# Patient Record
Sex: FEMALE | URBAN - METROPOLITAN AREA
[De-identification: names, ages, dates, MRNs, and addresses within clinical notes are randomized per-mention and may not be internally consistent; named-entity substitution may affect disease eponyms.]

---

## 2024-05-07 SDOH — ECONOMIC STABILITY: TRANSPORTATION INSECURITY
IN THE PAST 12 MONTHS, HAS THE LACK OF TRANSPORTATION KEPT YOU FROM MEDICAL APPOINTMENTS OR FROM GETTING MEDICATIONS?: YES

## 2024-05-07 SDOH — ECONOMIC STABILITY: INCOME INSECURITY: IN THE LAST 12 MONTHS, WAS THERE A TIME WHEN YOU WERE NOT ABLE TO PAY THE MORTGAGE OR RENT ON TIME?: NO

## 2024-05-07 SDOH — ECONOMIC STABILITY: TRANSPORTATION INSECURITY
IN THE PAST 12 MONTHS, HAS LACK OF RELIABLE TRANSPORTATION KEPT YOU FROM MEDICAL APPOINTMENTS, MEETINGS, WORK OR FROM GETTING THINGS NEEDED FOR DAILY LIVING?: NO

## 2024-05-07 SDOH — ECONOMIC STABILITY: HOUSING INSECURITY: IN THE LAST 12 MONTHS, HOW MANY PLACES HAVE YOU LIVED?: 1

## 2024-05-07 SDOH — ECONOMIC STABILITY: HOUSING INSECURITY
IN THE LAST 12 MONTHS, WAS THERE A TIME WHEN YOU DID NOT HAVE A STEADY PLACE TO SLEEP OR SLEPT IN A SHELTER (INCLUDING NOW)?: NO

## 2024-05-07 SDOH — HEALTH STABILITY: PHYSICAL HEALTH: ON AVERAGE, HOW MANY DAYS PER WEEK DO YOU ENGAGE IN MODERATE TO STRENUOUS EXERCISE (LIKE A BRISK WALK)?: 6 DAYS

## 2024-05-07 SDOH — ECONOMIC STABILITY: FOOD INSECURITY: WITHIN THE PAST 12 MONTHS, THE FOOD YOU BOUGHT JUST DIDN'T LAST AND YOU DIDN'T HAVE MONEY TO GET MORE.: NEVER TRUE

## 2024-05-07 SDOH — HEALTH STABILITY: PHYSICAL HEALTH: ON AVERAGE, HOW MANY MINUTES DO YOU ENGAGE IN EXERCISE AT THIS LEVEL?: 120 MIN

## 2024-05-07 SDOH — ECONOMIC STABILITY: FOOD INSECURITY: WITHIN THE PAST 12 MONTHS, YOU WORRIED THAT YOUR FOOD WOULD RUN OUT BEFORE YOU GOT MONEY TO BUY MORE.: NEVER TRUE

## 2024-05-07 SDOH — ECONOMIC STABILITY: INCOME INSECURITY: HOW HARD IS IT FOR YOU TO PAY FOR THE VERY BASICS LIKE FOOD, HOUSING, MEDICAL CARE, AND HEATING?: NOT VERY HARD

## 2024-05-07 SDOH — ECONOMIC STABILITY: TRANSPORTATION INSECURITY
IN THE PAST 12 MONTHS, HAS LACK OF TRANSPORTATION KEPT YOU FROM MEETINGS, WORK, OR FROM GETTING THINGS NEEDED FOR DAILY LIVING?: NO

## 2024-05-07 SDOH — HEALTH STABILITY: MENTAL HEALTH
STRESS IS WHEN SOMEONE FEELS TENSE, NERVOUS, ANXIOUS, OR CAN'T SLEEP AT NIGHT BECAUSE THEIR MIND IS TROUBLED. HOW STRESSED ARE YOU?: TO SOME EXTENT

## 2024-05-07 ASSESSMENT — LIFESTYLE VARIABLES
HOW MANY STANDARD DRINKS CONTAINING ALCOHOL DO YOU HAVE ON A TYPICAL DAY: 1 OR 2
HOW OFTEN DO YOU HAVE SIX OR MORE DRINKS ON ONE OCCASION: NEVER
HOW OFTEN DO YOU HAVE A DRINK CONTAINING ALCOHOL: MONTHLY OR LESS
AUDIT-C TOTAL SCORE: 1
SKIP TO QUESTIONS 9-10: 1

## 2024-05-07 ASSESSMENT — SOCIAL DETERMINANTS OF HEALTH (SDOH)
HOW OFTEN DO YOU ATTEND CHURCH OR RELIGIOUS SERVICES?: NEVER
HOW OFTEN DO YOU GET TOGETHER WITH FRIENDS OR RELATIVES?: MORE THAN THREE TIMES A WEEK
ARE YOU MARRIED, WIDOWED, DIVORCED, SEPARATED, NEVER MARRIED, OR LIVING WITH A PARTNER?: PATIENT DECLINED
HOW HARD IS IT FOR YOU TO PAY FOR THE VERY BASICS LIKE FOOD, HOUSING, MEDICAL CARE, AND HEATING?: NOT VERY HARD
HOW OFTEN DO YOU HAVE SIX OR MORE DRINKS ON ONE OCCASION: NEVER
HOW OFTEN DO YOU ATTEND CHURCH OR RELIGIOUS SERVICES?: NEVER
DO YOU BELONG TO ANY CLUBS OR ORGANIZATIONS SUCH AS CHURCH GROUPS UNIONS, FRATERNAL OR ATHLETIC GROUPS, OR SCHOOL GROUPS?: NO
IN A TYPICAL WEEK, HOW MANY TIMES DO YOU TALK ON THE PHONE WITH FAMILY, FRIENDS, OR NEIGHBORS?: MORE THAN THREE TIMES A WEEK
HOW MANY DRINKS CONTAINING ALCOHOL DO YOU HAVE ON A TYPICAL DAY WHEN YOU ARE DRINKING: 1 OR 2
HOW OFTEN DO YOU GET TOGETHER WITH FRIENDS OR RELATIVES?: MORE THAN THREE TIMES A WEEK
HOW OFTEN DO YOU ATTENT MEETINGS OF THE CLUB OR ORGANIZATION YOU BELONG TO?: PATIENT DECLINED
DO YOU BELONG TO ANY CLUBS OR ORGANIZATIONS SUCH AS CHURCH GROUPS UNIONS, FRATERNAL OR ATHLETIC GROUPS, OR SCHOOL GROUPS?: NO
WITHIN THE PAST 12 MONTHS, YOU WORRIED THAT YOUR FOOD WOULD RUN OUT BEFORE YOU GOT THE MONEY TO BUY MORE: NEVER TRUE
IN A TYPICAL WEEK, HOW MANY TIMES DO YOU TALK ON THE PHONE WITH FAMILY, FRIENDS, OR NEIGHBORS?: MORE THAN THREE TIMES A WEEK
HOW OFTEN DO YOU ATTENT MEETINGS OF THE CLUB OR ORGANIZATION YOU BELONG TO?: PATIENT DECLINED
HOW OFTEN DO YOU HAVE A DRINK CONTAINING ALCOHOL: MONTHLY OR LESS
ARE YOU MARRIED, WIDOWED, DIVORCED, SEPARATED, NEVER MARRIED, OR LIVING WITH A PARTNER?: PATIENT DECLINED

## 2024-05-10 ENCOUNTER — APPOINTMENT (OUTPATIENT)
Dept: MEDICAL GROUP | Facility: IMAGING CENTER | Age: 19
End: 2024-05-10
Payer: COMMERCIAL

## 2024-05-10 VITALS
HEART RATE: 78 BPM | RESPIRATION RATE: 16 BRPM | BODY MASS INDEX: 37.12 KG/M2 | TEMPERATURE: 98 F | DIASTOLIC BLOOD PRESSURE: 70 MMHG | OXYGEN SATURATION: 96 % | SYSTOLIC BLOOD PRESSURE: 112 MMHG | HEIGHT: 61 IN | WEIGHT: 196.6 LBS

## 2024-05-10 DIAGNOSIS — Z13.21 ENCOUNTER FOR VITAMIN DEFICIENCY SCREENING: ICD-10-CM

## 2024-05-10 DIAGNOSIS — L73.2 SUPPURATIVE HIDRADENITIS: ICD-10-CM

## 2024-05-10 DIAGNOSIS — N91.1 SECONDARY AMENORRHEA: ICD-10-CM

## 2024-05-10 DIAGNOSIS — E66.9 CLASS 2 OBESITY WITHOUT SERIOUS COMORBIDITY WITH BODY MASS INDEX (BMI) OF 37.0 TO 37.9 IN ADULT, UNSPECIFIED OBESITY TYPE: ICD-10-CM

## 2024-05-10 DIAGNOSIS — Z11.59 NEED FOR HEPATITIS C SCREENING TEST: ICD-10-CM

## 2024-05-10 DIAGNOSIS — J45.20 MILD INTERMITTENT ASTHMA WITHOUT COMPLICATION: ICD-10-CM

## 2024-05-10 DIAGNOSIS — Z11.4 SCREENING FOR HIV (HUMAN IMMUNODEFICIENCY VIRUS): ICD-10-CM

## 2024-05-10 PROBLEM — E66.812 CLASS 2 OBESITY WITHOUT SERIOUS COMORBIDITY WITH BODY MASS INDEX (BMI) OF 37.0 TO 37.9 IN ADULT: Status: ACTIVE | Noted: 2024-05-10

## 2024-05-10 PROBLEM — J45.909 ASTHMA: Status: ACTIVE | Noted: 2024-05-10

## 2024-05-10 PROCEDURE — 99204 OFFICE O/P NEW MOD 45 MIN: CPT | Performed by: STUDENT IN AN ORGANIZED HEALTH CARE EDUCATION/TRAINING PROGRAM

## 2024-05-10 PROCEDURE — 3074F SYST BP LT 130 MM HG: CPT | Performed by: STUDENT IN AN ORGANIZED HEALTH CARE EDUCATION/TRAINING PROGRAM

## 2024-05-10 PROCEDURE — 3078F DIAST BP <80 MM HG: CPT | Performed by: STUDENT IN AN ORGANIZED HEALTH CARE EDUCATION/TRAINING PROGRAM

## 2024-05-10 RX ORDER — ALBUTEROL SULFATE 90 UG/1
2 AEROSOL, METERED RESPIRATORY (INHALATION) EVERY 4 HOURS PRN
Qty: 1 EACH | Refills: 3 | Status: SHIPPED | OUTPATIENT
Start: 2024-05-10

## 2024-05-10 ASSESSMENT — PATIENT HEALTH QUESTIONNAIRE - PHQ9: CLINICAL INTERPRETATION OF PHQ2 SCORE: 0

## 2024-05-10 NOTE — PROGRESS NOTES
Subjective:       CC:   Chief Complaint   Patient presents with    Establish Care    Other     No period since 7/2023, was on birth control then stopped and that's when the periods stopped       HPI:     Verbal consent was acquired by the patient to use [employer] ambient listening note generation during this visit Yes      Alissa is a 19 y.o. female is new to me and is here today to establish care. Previous PCP was unknown.  Pt would like to discuss the following today    History of Present Illness  The patient presents to the office to establish care.    Amenorrhea:  The patient was previously on oral contraceptives for a duration of 2 years, which she initiated during her senior year of high school. She discontinued the medication in 06/2023 and has not menstruated since. Her last menstrual period was slightly over a year ago, and prior to this, her menstrual cycles were regular, lasting between 6 to 7 days. She denies any history of Polycystic Ovary Syndrome (PCOS), but reports a family history of it. She has observed weight gain. Denies acne, facial hair growth, and voice hoarseness. In 2023, a blood test conducted by her dermatologist, Dr. Mahan in [place], suspected hypothyroidism, which was subsequently ruled out. She is participating in a 3-month research partnership at the [facility]. She has never been sexually active.    HS:  The patient was diagnosed with hidradenitis supprativa by her dermatologist, Dr. Mahan, in [place]. She experiences intermittent flare-ups, but is open to exploring strategies to manage them. Her treatment regimen includes doxycycline, but she is not currently taking it.    Asthma:  The patient was diagnosed with asthma during her infancy. She occasionally experiences difficulty breathing, cough, and wheezing, particularly during sleep and in cold weather. She does not currently use any inhalers.    Supplemental Information  She was on metformin , but she  discontinued it due to nausea   She has a family history of PCOS.   She is up-to-date on her meningitis and tetanus vaccines.         Health Maintenance/Immunizations: Recommend COVID-19 booster at her pharmacy, otherwise she is up-to-date.    ROS:   See HPI    Patient Active Problem List    Diagnosis Date Noted    Asthma 05/10/2024    Suppurative hidradenitis 05/10/2024    Secondary amenorrhea 05/10/2024    Class 2 obesity without serious comorbidity with body mass index (BMI) of 37.0 to 37.9 in adult 05/10/2024       Past Medical History:   Diagnosis Date    Asthma 5/10/2024    Hyperlipidemia        Current Outpatient Medications   Medication Sig Dispense Refill    albuterol 108 (90 Base) MCG/ACT Aero Soln inhalation aerosol Inhale 2 Puffs every four hours as needed for Shortness of Breath. 1 Each 3     No current facility-administered medications for this visit.       Allergies as of 05/10/2024 - Reviewed 05/10/2024   Allergen Reaction Noted    Metformin Nausea 05/10/2024       Social History     Socioeconomic History    Marital status: Single     Spouse name: Not on file    Number of children: Not on file    Years of education: Not on file    Highest education level: Some college, no degree   Occupational History    Not on file   Tobacco Use    Smoking status: Never    Smokeless tobacco: Never   Vaping Use    Vaping Use: Never used   Substance and Sexual Activity    Alcohol use: Not Currently    Drug use: Never    Sexual activity: Never   Other Topics Concern    Not on file   Social History Narrative    Not on file     Social Determinants of Health     Financial Resource Strain: Low Risk  (5/7/2024)    Overall Financial Resource Strain (CARDIA)     Difficulty of Paying Living Expenses: Not very hard   Food Insecurity: No Food Insecurity (5/7/2024)    Hunger Vital Sign     Worried About Running Out of Food in the Last Year: Never true     Ran Out of Food in the Last Year: Never true   Transportation Needs: Unmet  "Transportation Needs (5/7/2024)    PRAPARE - Transportation     Lack of Transportation (Medical): Yes     Lack of Transportation (Non-Medical): No   Physical Activity: Sufficiently Active (5/7/2024)    Exercise Vital Sign     Days of Exercise per Week: 6 days     Minutes of Exercise per Session: 120 min   Stress: Stress Concern Present (5/7/2024)    [employer] - Occupational Stress Questionnaire     Feeling of Stress : To some extent   Social Connections: Unknown (5/7/2024)    Social Connection and Isolation Panel [NHANES]     Frequency of Communication with Friends and Family: More than three times a week     Frequency of Social Gatherings with Friends and Family: More than three times a week     Attends Anglican Services: Never     Active Member of Clubs or Organizations: No     Attends Club or Organization Meetings: Patient declined     Marital Status: Patient declined   Intimate Partner Violence: Not on file   Housing Stability: Low Risk  (5/7/2024)    Housing Stability Vital Sign     Unable to Pay for Housing in the Last Year: No     Number of Places Lived in the Last Year: 1     Unstable Housing in the Last Year: No       Family History   Problem Relation Age of Onset    Hypertension Mother     Cancer Father     Diabetes Maternal Grandmother        History reviewed. No pertinent surgical history.        Objective:     /70 (BP Location: Left arm, Patient Position: Sitting, BP Cuff Size: Adult)   Pulse 78   Temp 36.7 °C (98 °F) (Temporal)   Resp 16   Ht 1.549 m (5' 1\")   Wt 89.2 kg (196 lb 9.6 oz)   LMP 07/01/2023   SpO2 96%   BMI 37.15 kg/m²     Physical Exam  Constitutional:       General: She is not in acute distress.     Appearance: Normal appearance.   HENT:      Head: Normocephalic and atraumatic.   Eyes:      General: No scleral icterus.  Neck:      Thyroid: No thyroid mass or thyromegaly.   Cardiovascular:      Rate and Rhythm: Normal rate and regular rhythm. "      Heart sounds: Normal heart sounds. No murmur heard.  Pulmonary:      Effort: Pulmonary effort is normal.      Breath sounds: Normal breath sounds. No wheezing.   Abdominal:      General: Bowel sounds are normal. There is no distension.      Palpations: Abdomen is soft. There is no mass.      Tenderness: There is no abdominal tenderness.   Musculoskeletal:         General: No swelling. Normal range of motion.      Cervical back: Neck supple.   Skin:     General: Skin is warm and dry.   Neurological:      General: No focal deficit present.      Mental Status: She is alert and oriented to person, place, and time.      Gait: Gait normal.   Psychiatric:         Mood and Affect: Mood normal.         Behavior: Behavior normal.         Thought Content: Thought content normal.         Judgment: Judgment normal.            Assessment and Plan:     Assessment & Plan    1. Secondary amenorrhea  Chronic.  Unknown etiology but suspect PCOS.  A comprehensive panel of laboratory tests will be ordered to assess hormone levels, thyroid function, A1c levels, comprehensive metabolic panel (CMP), complete blood count (CBC), vitamin D levels, and lipid panel. Additionally, a one-time screening for Hepatitis C and HIV will be conducted. A pelvic ultrasound will be ordered to evaluate the reproductive organs and identify any potential signs of Polycystic Ovary Syndrome. The patient has been advised to fast for 8 to 10 hours prior to the tests. She has been encouraged to maintain a healthy diet and regular exercise regimen. Should the patient decide to resume birth control or medications to aid with weight loss, she will inform us so we can prescribe these.  - US-PELVIC COMPLETE (TRANSABDOMINAL/TRANSVAGINAL) (COMBO); Future  - DHEA SULFATE; Future  - HEMOGLOBIN A1C; Future  - TSH WITH REFLEX TO FT4; Future  - PROLACTIN; Future  - TESTOSTERONE F&T FEMALES/CHILD; Future  - PROGESTERONE; Future  - ESTROGEN TOTAL; Future  - FSH/LH;  Future  - CORTISOL - AM  - Comp Metabolic Panel; Future  - CBC WITH DIFFERENTIAL; Future    2. Class 2 obesity without serious comorbidity with body mass index (BMI) of 37.0 to 37.9 in adult, unspecified obesity type  Chronic.  Discussed lifestyle modifications to help lose weight and help prevent disease such as diabetes and heart disease.  Improve your eating habits.   Eat a variety of foods from each food group: include whole grains, vegetables, fruits, low fat or fat free dairy, and protein foods, mostly plant based. Limit foods high in fat, sugar, calories, and sodium. Eat slowly, and don't do anything else, such as watch TV, while you are eating. Pay attention to portion sizes. Put your food on a smaller plate, follow MyPlate guidelines:vegetables make up the largest section, followed by grains. Together, fruits and vegetables fill half the plate, while proteins and grains fill the other half.  Regular activity can help you feel better, have more energy, and burn more calories. If you haven't been active, start slowly. Start with at least 30 minutes of moderate activity on most days of the week; then gradually increase the amount of activity. Try for 60 or 90 minutes a day, at least 5 days a week.   - US-PELVIC COMPLETE (TRANSABDOMINAL/TRANSVAGINAL) (COMBO); Future  - DHEA SULFATE; Future  - HEMOGLOBIN A1C; Future  - TSH WITH REFLEX TO FT4; Future  - PROLACTIN; Future  - TESTOSTERONE F&T FEMALES/CHILD; Future  - PROGESTERONE; Future  - ESTROGEN TOTAL; Future  - FSH/LH; Future  - CORTISOL - AM  - Comp Metabolic Panel; Future  - CBC WITH DIFFERENTIAL; Future  - Lipid Profile; Future    3. Need for hepatitis C screening test  - HEP C VIRUS ANTIBODY; Future    4. Screening for HIV (human immunodeficiency virus)  - HIV AG/AB COMBO ASSAY SCREENING; Future    5. Encounter for vitamin deficiency screening  - VITAMIN D 25-HYDROXY    6. Mild intermittent asthma without complication  Chronic and well-controlled.  Lungs  are clear to auscultation bilaterally.  Vital signs are stable.  A prescription for an albuterol inhaler has been issued, with instructions to use 1 to 2 puffs as needed. The patient has been advised to carry her inhaler with her at all times. Should the patient's asthma symptoms worsen, a maintenance inhaler will be recommended.  - albuterol 108 (90 Base) MCG/ACT Aero Soln inhalation aerosol; Inhale 2 Puffs every four hours as needed for Shortness of Breath.  Dispense: 1 Each; Refill: 3    7. Suppurative hidradenitis  Chronic and stable.  Patient would like to see dermatologist for this.  Referral placed.  - Referral to Dermatology         Follow-up  The patient is scheduled for a follow-up visit in 3 months.         Diagnosis and treatment plan explained to pt. Pt agreed with treatment plan and verbalized understanding.       Return in about 3 months (around 8/10/2024) for Lab Results.     Please note that this dictation was created using voice recognition software. I have made every reasonable attempt to correct obvious errors, but I expect that there are errors of grammar and possibly content that I did not discover before finalizing the note.    Karina Burton PA-C  [facility]

## 2024-05-10 NOTE — PATIENT INSTRUCTIONS
Thank you for choosing [facility]. It was a pleasure meeting you today.     Take care!  Karina JonesF[facility]

## 2024-08-05 ENCOUNTER — APPOINTMENT (OUTPATIENT)
Dept: MEDICAL GROUP | Facility: IMAGING CENTER | Age: 19
End: 2024-08-05
Payer: COMMERCIAL

## 2024-08-05 VITALS
HEART RATE: 69 BPM | HEIGHT: 61 IN | WEIGHT: 190 LBS | OXYGEN SATURATION: 97 % | TEMPERATURE: 98.1 F | RESPIRATION RATE: 16 BRPM | BODY MASS INDEX: 35.87 KG/M2 | SYSTOLIC BLOOD PRESSURE: 114 MMHG | DIASTOLIC BLOOD PRESSURE: 74 MMHG

## 2024-08-05 DIAGNOSIS — J45.20 MILD INTERMITTENT ASTHMA WITHOUT COMPLICATION: ICD-10-CM

## 2024-08-05 DIAGNOSIS — N91.1 SECONDARY AMENORRHEA: ICD-10-CM

## 2024-08-05 PROCEDURE — 3074F SYST BP LT 130 MM HG: CPT | Performed by: STUDENT IN AN ORGANIZED HEALTH CARE EDUCATION/TRAINING PROGRAM

## 2024-08-05 PROCEDURE — 3078F DIAST BP <80 MM HG: CPT | Performed by: STUDENT IN AN ORGANIZED HEALTH CARE EDUCATION/TRAINING PROGRAM

## 2024-08-05 PROCEDURE — 99213 OFFICE O/P EST LOW 20 MIN: CPT | Performed by: STUDENT IN AN ORGANIZED HEALTH CARE EDUCATION/TRAINING PROGRAM

## 2024-08-05 NOTE — PROGRESS NOTES
"Subjective:     CC:   Chief Complaint   Patient presents with    Follow-Up       HPI:   Alissa Hoskins lupe Meng, 19 y.o., female,  presents today to discuss:     Amenorrhea f/u: reports no changes since last visit.  LMP was last summer. Denies spotting. Sometimes she has pelvic cramps; but mostly after consuming lactose. She has never being sexually active.  She has not done her labs or her ultrasound    Asthma f/u: Well-controlled.  Denies symptoms such as shortness of breath, cough, or wheezing.    ROS:  Denies CP, SOB, fever, and chills.     Medications, allergies, past medical history, family history, surgical history, and social history documented in chart and reviewed by me.       Objective:   Exam:  /74 (BP Location: Left arm, Patient Position: Sitting, BP Cuff Size: Adult)   Pulse 69   Temp 36.7 °C (98.1 °F) (Temporal)   Resp 16   Ht 1.549 m (5' 1\")   Wt 86.2 kg (190 lb)   SpO2 97%   BMI 35.90 kg/m²      General: In no acute distress. Normal appearance.   Head:   Atraumatic, normocephalic.   Neck: Supple without lymphadenopathy.   Pulmonary: Normal effort, clear to auscultation bilaterally, no wheezes, rhonchi, or rales  Cardiovascular:   Regular rate and rhythm. No murmurs, rubs, or gallops.  Musculoskeletal:  Normal ROM. No edema.    Skin: No visible rashes or lesions.  Neurological: Alert and oriented to person, place, and time. Gait normal.   Psychiatric: Normal mood and affect. Calm and friendly behavior. Speech clear. Normal judgement and insight.         Assessment & Plan:     1. Secondary amenorrhea  Chronic and ongoing.  Suspect PCOS.  Patient did not complete her tests ordered on previous visit.  Patient will go to the laboratory after our appointment for blood drawn.  Patient will call [facility] to schedule her pelvic ultrasound.  Will follow-up with patient after completing tests.    2. Mild intermittent asthma without complication  Chronic and well-controlled.  " Strongly recommend pneumococcal vaccine.  Patient stated she would like to do more research about this vaccine first.  I also recommend men B meningococcal immunizations.  Will continue with albuterol rescue inhaler as needed.       Pt agreed with treatment plan and verbalized understanding.     Return for f/u after completing tests.     Please note that this dictation was created using voice recognition software. I have made every reasonable attempt to correct obvious errors, but I expect that there are errors of grammar and possibly content that I did not discover before finalizing the note.    Karina Burton PA-C  [facility]

## 2024-08-06 ENCOUNTER — HOSPITAL ENCOUNTER (OUTPATIENT)
Dept: LAB | Facility: MEDICAL CENTER | Age: 19
End: 2024-08-06
Attending: STUDENT IN AN ORGANIZED HEALTH CARE EDUCATION/TRAINING PROGRAM
Payer: COMMERCIAL

## 2024-08-06 DIAGNOSIS — Z11.59 NEED FOR HEPATITIS C SCREENING TEST: ICD-10-CM

## 2024-08-06 DIAGNOSIS — N91.1 SECONDARY AMENORRHEA: ICD-10-CM

## 2024-08-06 DIAGNOSIS — Z11.4 SCREENING FOR HIV (HUMAN IMMUNODEFICIENCY VIRUS): ICD-10-CM

## 2024-08-06 DIAGNOSIS — E66.9 CLASS 2 OBESITY WITHOUT SERIOUS COMORBIDITY WITH BODY MASS INDEX (BMI) OF 37.0 TO 37.9 IN ADULT, UNSPECIFIED OBESITY TYPE: ICD-10-CM

## 2024-08-06 LAB
25(OH)D3 SERPL-MCNC: 26 NG/ML (ref 30–100)
ALBUMIN SERPL BCP-MCNC: 4.6 G/DL (ref 3.2–4.9)
ALBUMIN/GLOB SERPL: 1.2 G/DL
ALP SERPL-CCNC: 102 U/L (ref 30–99)
ALT SERPL-CCNC: 18 U/L (ref 2–50)
ANION GAP SERPL CALC-SCNC: 11 MMOL/L (ref 7–16)
AST SERPL-CCNC: 19 U/L (ref 12–45)
BASOPHILS # BLD AUTO: 0.5 % (ref 0–1.8)
BASOPHILS # BLD: 0.03 K/UL (ref 0–0.12)
BILIRUB SERPL-MCNC: 0.3 MG/DL (ref 0.1–1.5)
BUN SERPL-MCNC: 14 MG/DL (ref 8–22)
CALCIUM ALBUM COR SERPL-MCNC: 9.5 MG/DL (ref 8.5–10.5)
CALCIUM SERPL-MCNC: 10 MG/DL (ref 8.5–10.5)
CHLORIDE SERPL-SCNC: 100 MMOL/L (ref 96–112)
CHOLEST SERPL-MCNC: 207 MG/DL (ref 100–199)
CO2 SERPL-SCNC: 25 MMOL/L (ref 20–33)
CORTIS SERPL-MCNC: 7.4 UG/DL (ref 0–23)
CREAT SERPL-MCNC: 0.67 MG/DL (ref 0.5–1.4)
DHEA-S SERPL-MCNC: 337 UG/DL (ref 148–407)
EOSINOPHIL # BLD AUTO: 0.08 K/UL (ref 0–0.51)
EOSINOPHIL NFR BLD: 1.3 % (ref 0–6.9)
ERYTHROCYTE [DISTWIDTH] IN BLOOD BY AUTOMATED COUNT: 41.1 FL (ref 35.9–50)
EST. AVERAGE GLUCOSE BLD GHB EST-MCNC: 105 MG/DL
FSH SERPL-ACNC: 6.4 MIU/ML
GFR SERPLBLD CREATININE-BSD FMLA CKD-EPI: 128 ML/MIN/1.73 M 2
GLOBULIN SER CALC-MCNC: 4 G/DL (ref 1.9–3.5)
GLUCOSE SERPL-MCNC: 75 MG/DL (ref 65–99)
HBA1C MFR BLD: 5.3 % (ref 4–5.6)
HCT VFR BLD AUTO: 46.3 % (ref 37–47)
HCV AB SER QL: NORMAL
HDLC SERPL-MCNC: 38 MG/DL
HGB BLD-MCNC: 15.2 G/DL (ref 12–16)
HIV 1+2 AB+HIV1 P24 AG SERPL QL IA: NORMAL
IMM GRANULOCYTES # BLD AUTO: 0.02 K/UL (ref 0–0.11)
IMM GRANULOCYTES NFR BLD AUTO: 0.3 % (ref 0–0.9)
LDLC SERPL CALC-MCNC: 144 MG/DL
LH SERPL-ACNC: 16.1 IU/L
LYMPHOCYTES # BLD AUTO: 1.64 K/UL (ref 1–4.8)
LYMPHOCYTES NFR BLD: 27.2 % (ref 22–41)
MCH RBC QN AUTO: 29.6 PG (ref 27–33)
MCHC RBC AUTO-ENTMCNC: 32.8 G/DL (ref 32.2–35.5)
MCV RBC AUTO: 90.3 FL (ref 81.4–97.8)
MONOCYTES # BLD AUTO: 0.5 K/UL (ref 0–0.85)
MONOCYTES NFR BLD AUTO: 8.3 % (ref 0–13.4)
NEUTROPHILS # BLD AUTO: 3.76 K/UL (ref 1.82–7.42)
NEUTROPHILS NFR BLD: 62.4 % (ref 44–72)
NRBC # BLD AUTO: 0 K/UL
NRBC BLD-RTO: 0 /100 WBC (ref 0–0.2)
PLATELET # BLD AUTO: 288 K/UL (ref 164–446)
PMV BLD AUTO: 10 FL (ref 9–12.9)
POTASSIUM SERPL-SCNC: 4.3 MMOL/L (ref 3.6–5.5)
PROGEST SERPL-MCNC: 0.11 NG/ML
PROLACTIN SERPL-MCNC: 7.97 NG/ML (ref 2.8–26)
PROT SERPL-MCNC: 8.6 G/DL (ref 6–8.2)
RBC # BLD AUTO: 5.13 M/UL (ref 4.2–5.4)
SODIUM SERPL-SCNC: 136 MMOL/L (ref 135–145)
TRIGL SERPL-MCNC: 123 MG/DL (ref 0–149)
TSH SERPL DL<=0.005 MIU/L-ACNC: 1.27 UIU/ML (ref 0.38–5.33)
WBC # BLD AUTO: 6 K/UL (ref 4.8–10.8)

## 2024-08-06 PROCEDURE — 86803 HEPATITIS C AB TEST: CPT

## 2024-08-06 PROCEDURE — 84144 ASSAY OF PROGESTERONE: CPT

## 2024-08-06 PROCEDURE — 84403 ASSAY OF TOTAL TESTOSTERONE: CPT

## 2024-08-06 PROCEDURE — 80061 LIPID PANEL: CPT

## 2024-08-06 PROCEDURE — 83001 ASSAY OF GONADOTROPIN (FSH): CPT

## 2024-08-06 PROCEDURE — 85025 COMPLETE CBC W/AUTO DIFF WBC: CPT

## 2024-08-06 PROCEDURE — 36415 COLL VENOUS BLD VENIPUNCTURE: CPT

## 2024-08-06 PROCEDURE — 83036 HEMOGLOBIN GLYCOSYLATED A1C: CPT

## 2024-08-06 PROCEDURE — 82627 DEHYDROEPIANDROSTERONE: CPT

## 2024-08-06 PROCEDURE — 82671 ASSAY OF ESTROGENS: CPT

## 2024-08-06 PROCEDURE — 82306 VITAMIN D 25 HYDROXY: CPT

## 2024-08-06 PROCEDURE — 82533 TOTAL CORTISOL: CPT

## 2024-08-06 PROCEDURE — 83002 ASSAY OF GONADOTROPIN (LH): CPT

## 2024-08-06 PROCEDURE — 84270 ASSAY OF SEX HORMONE GLOBUL: CPT

## 2024-08-06 PROCEDURE — 84146 ASSAY OF PROLACTIN: CPT

## 2024-08-06 PROCEDURE — 84402 ASSAY OF FREE TESTOSTERONE: CPT

## 2024-08-06 PROCEDURE — 84443 ASSAY THYROID STIM HORMONE: CPT

## 2024-08-06 PROCEDURE — 80053 COMPREHEN METABOLIC PANEL: CPT

## 2024-08-06 PROCEDURE — 87389 HIV-1 AG W/HIV-1&-2 AB AG IA: CPT

## 2024-08-10 LAB
SHBG SERPL-SCNC: 16 NMOL/L (ref 25–122)
TESTOST FREE SERPL-MCNC: 6.4 PG/ML (ref 0.8–7.4)
TESTOST SERPL-MCNC: 28 NG/DL (ref 9–55)

## 2024-08-11 LAB
ESTRADIOL SERPL HS-MCNC: 52.6 PG/ML
ESTROGEN SERPL CALC-MCNC: 99.9 PG/ML
ESTRONE SERPL-MCNC: 47.3 PG/ML

## 2024-08-27 ENCOUNTER — OFFICE VISIT (OUTPATIENT)
Dept: DERMATOLOGY | Facility: IMAGING CENTER | Age: 19
End: 2024-08-27
Payer: COMMERCIAL

## 2024-08-27 DIAGNOSIS — L73.2 HIDRADENITIS SUPPURATIVA: ICD-10-CM

## 2024-08-27 RX ORDER — CLINDAMYCIN PHOSPHATE 11.9 MG/ML
SOLUTION TOPICAL
Qty: 60 ML | Refills: 3 | Status: SHIPPED | OUTPATIENT
Start: 2024-08-27

## 2024-08-27 RX ORDER — DOXYCYCLINE HYCLATE 100 MG
100 TABLET ORAL 2 TIMES DAILY
Qty: 28 TABLET | Refills: 0 | Status: SHIPPED | OUTPATIENT
Start: 2024-08-27 | End: 2024-09-10

## 2024-08-27 NOTE — PROGRESS NOTES
DERMATOLOGY NOTE  NEW VISIT       Chief complaint: Establish Care (HS)      Patient states she has HS was previously seen by old derm in [facility] wants to establish care.          History of skin cancer: No  History of precancers/actinic keratoses: No  History of biopsies:No  History of blistering/severe sunburns:No  Family history of skin cancer:Yes, Details: Father Melanoma   Family history of atypical moles:No      Allergies   Allergen Reactions    Metformin Nausea        MEDICATIONS:  Medications relevant to specialty reviewed.     REVIEW OF SYSTEMS:   Positive for skin (see HPI)  Negative for fevers and chills       EXAM:  There were no vitals taken for this visit.  Constitutional: Well-developed, well-nourished, and in no distress.     A focused skin exam was performed including the affected areas of the bilateral axilla. Notable findings on exam today listed below and/or in assessment/plan.     Few small erythematous papules to bilateral axilla, no evidence of double comedones    IMPRESSION / PLAN:    1. 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  Dispense: 28 Tablet; Refill: 0  - clindamycin (CLEOCIN) 1 % Solution; AAA twice a day until clear/improved, then use 2-3 times per week for maintenance  Dispense: 60 mL; Refill: 3      Discussed risks, benefits, alternative treatments as well as common side effects associated with prescribed treatment, Patient verbalized understanding and agrees with plan regarding he above        Please note that this dictation was created using voice recognition software. I have made every  reasonable attempt to correct obvious errors, but I expect that there are errors of grammar and possibly content that I did not discover before finalizing the note.      Return to clinic in: Return in about 3 months (around 11/27/2024) for HS follow up. and as needed for any new or changing skin lesions.

## 2024-09-11 ENCOUNTER — APPOINTMENT (OUTPATIENT)
Dept: MEDICAL GROUP | Facility: IMAGING CENTER | Age: 19
End: 2024-09-11
Payer: COMMERCIAL

## 2024-09-11 VITALS
DIASTOLIC BLOOD PRESSURE: 70 MMHG | RESPIRATION RATE: 16 BRPM | WEIGHT: 199 LBS | BODY MASS INDEX: 37.57 KG/M2 | OXYGEN SATURATION: 97 % | HEIGHT: 61 IN | HEART RATE: 75 BPM | SYSTOLIC BLOOD PRESSURE: 114 MMHG | TEMPERATURE: 98.1 F

## 2024-09-11 DIAGNOSIS — Z23 ENCOUNTER FOR ADMINISTRATION OF VACCINE: ICD-10-CM

## 2024-09-11 DIAGNOSIS — E88.09 HYPERPROTEINEMIA: ICD-10-CM

## 2024-09-11 DIAGNOSIS — N91.1 SECONDARY AMENORRHEA: ICD-10-CM

## 2024-09-11 DIAGNOSIS — E78.5 DYSLIPIDEMIA: ICD-10-CM

## 2024-09-11 DIAGNOSIS — R79.89 ABNORMAL SERUM TESTOSTERONE LEVEL: ICD-10-CM

## 2024-09-11 DIAGNOSIS — E55.9 VITAMIN D INSUFFICIENCY: ICD-10-CM

## 2024-09-11 PROCEDURE — 99213 OFFICE O/P EST LOW 20 MIN: CPT | Mod: 25 | Performed by: STUDENT IN AN ORGANIZED HEALTH CARE EDUCATION/TRAINING PROGRAM

## 2024-09-11 PROCEDURE — 90677 PCV20 VACCINE IM: CPT | Performed by: STUDENT IN AN ORGANIZED HEALTH CARE EDUCATION/TRAINING PROGRAM

## 2024-09-11 PROCEDURE — 90471 IMMUNIZATION ADMIN: CPT | Performed by: STUDENT IN AN ORGANIZED HEALTH CARE EDUCATION/TRAINING PROGRAM

## 2024-09-11 ASSESSMENT — FIBROSIS 4 INDEX: FIB4 SCORE: 0.31

## 2024-09-11 NOTE — PROGRESS NOTES
"Subjective:     CC:   Chief Complaint   Patient presents with    Lab Results       HPI:   Alissa Menglupe, 19 y.o., female,  presents today to discuss:     Vitamin D insu: new finding. She started taking OTC Vitamin D3 1000 IU.     Amenorrhea f/u: she had a menstrual period 8/25/24, it lasted over a week. It was heavy and painful.     Otherwise, reports doing well and does not have other concerns today.    ROS:  See HPI    Medications, allergies, past medical history, family history, surgical history, and social history documented in chart and reviewed by me.       Objective:   Exam:  /70 (BP Location: Left arm, Patient Position: Sitting, BP Cuff Size: Adult)   Pulse 75   Temp 36.7 °C (98.1 °F) (Temporal)   Resp 16   Ht 1.549 m (5' 1\")   Wt 90.3 kg (199 lb)   LMP 08/25/2024   SpO2 97%   BMI 37.60 kg/m²      General: In no acute distress. Normal appearance.   Head:   Atraumatic, normocephalic.   Neck: Supple without lymphadenopathy.   Pulmonary: Normal effort, clear to auscultation bilaterally, no wheezes, rhonchi, or rales  Cardiovascular:   Regular rate and rhythm. No murmurs, rubs, or gallops.  Musculoskeletal:  Normal ROM. No edema.    Skin: No visible rashes or lesions.  Neurological: Alert and oriented to person, place, and time. Gait normal.   Psychiatric: Normal mood and affect. Calm and friendly behavior. Speech clear. Normal judgement and insight.         Assessment & Plan:       1. Secondary amenorrhea  Chronic and improving.  Pt had a recent menstrual cycle. Labs reviewed today.  TSH, cortisol, DHEA-S, estradiol, estrone, total estrogen, FSH, LH, progesterone, and prolactin are unremarkable.  Testosterone panel remarkable for slightly low sex bind globulin, otherwise normal.  Patient will complete pelvic ultrasound.  Will follow-up after completing ultrasound.    2. Abnormal serum testosterone level  Acute. New finding.  Will repeat in 3 months.  - TESTOSTERONE F&T " FEMALES/CHILD; Future   Latest Reference Range & Units 08/06/24 09:50   Sex Hormone Bind Globulin 25 - 122 nmol/L 16 (L)   Testosterone Fr LCMS 0.8 - 7.4 pg/mL 6.4   Testosterone,Total 9 - 55 ng/dL 28   (L): Data is abnormally low    3. Hyperproteinemia  Acute.  New finding.  Recommend drinking more water and avoiding protein supplements.   Latest Reference Range & Units 08/06/24 09:50   Total Protein 6.0 - 8.2 g/dL 8.6 (H)   Globulin 1.9 - 3.5 g/dL 4.0 (H)   A-G Ratio g/dL 1.2   (H): Data is abnormally high  - Comp Metabolic Panel; Future    4. Vitamin D insufficiency  Acute.  New finding.  Patient started taking over-the-counter vitamin D3 1000 IUs daily.  Will continue to monitor.   Latest Reference Range & Units 08/06/24 09:50   25-Hydroxy   Vitamin D 25 30 - 100 ng/mL 26 (L)   (L): Data is abnormally low    5. Dyslipidemia  New diagnosis. Lipid panel reviewed with pt today. Counseled pt on a low cholesterol/carb diet and recommended physical activity for at least 30 min most days of the week. Will continue with yearly lipid panel checks or sooner if indicated.    I recommend the following to help lower your cholesterol:  Decrease intake of saturated fat. Sources of saturated fat include:  Processed meat, including hot dogs, sausage, duff and pepperoni.  Fatty cuts of meat, including ribs, poultry with the skin and highly marbled meat.  Full-fat dairy products, including butter, heavy cream, cream cheese and sour cream.  Coconut oil and palm oil.  Fried food.  2. Increase intake of poly-and monounsaturated fats (avocado, nuts, olive oil, soybean oil, corn oil, sunflower oil)  3. Increase intake of soluble fiber (oats, peas, beans, apples, citrus fruits, carrots, barley and psyllium)  4. Increase consumption of tree nuts  5. Increase intake of soy protein  6. Increase intake of omega-3 fatty acids from marine sources (fatty fish like salmon, flax seeds, sd seeds, walnuts.)  7. Follow a Mediterranean diet  (Olive oil is main dietary fat, limited amounts of red meat, dairy products, eggs, and poultry; increased amounts of vegetables, whole grains, fish, and tree nuts).  8. Physical activity for at least 30 minutes most days of the week   Latest Reference Range & Units 08/06/24 09:50   Cholesterol,Tot 100 - 199 mg/dL 207 (H)   Triglycerides 0 - 149 mg/dL 123   HDL >=40 mg/dL 38 !   LDL <100 mg/dL 144 (H)   (H): Data is abnormally high  !: Data is abnormal    6. Encounter for administration of vaccine  - Pneumococcal Conjugate Vaccine 20-Valent (6 wks+)     Pt agreed with treatment plan and verbalized understanding.       Return if symptoms worsen or fail to improve.     Please note that this dictation was created using voice recognition software. I have made every reasonable attempt to correct obvious errors, but I expect that there are errors of grammar and possibly content that I did not discover before finalizing the note.    Karina Burton PA-C  [facility]

## 2024-09-23 DIAGNOSIS — N91.1 SECONDARY AMENORRHEA: ICD-10-CM

## 2024-10-01 ENCOUNTER — HOSPITAL ENCOUNTER (OUTPATIENT)
Dept: RADIOLOGY | Facility: MEDICAL CENTER | Age: 19
End: 2024-10-01
Attending: STUDENT IN AN ORGANIZED HEALTH CARE EDUCATION/TRAINING PROGRAM
Payer: COMMERCIAL

## 2024-10-01 DIAGNOSIS — N91.1 SECONDARY AMENORRHEA: ICD-10-CM

## 2024-10-01 PROCEDURE — 76856 US EXAM PELVIC COMPLETE: CPT

## 2024-11-18 ENCOUNTER — APPOINTMENT (OUTPATIENT)
Dept: MEDICAL GROUP | Facility: IMAGING CENTER | Age: 19
End: 2024-11-18
Payer: COMMERCIAL

## 2024-11-27 ENCOUNTER — APPOINTMENT (OUTPATIENT)
Dept: MEDICAL GROUP | Facility: IMAGING CENTER | Age: 19
End: 2024-11-27
Payer: COMMERCIAL

## 2024-11-27 ENCOUNTER — TELEPHONE (OUTPATIENT)
Dept: DERMATOLOGY | Facility: IMAGING CENTER | Age: 19
End: 2024-11-27

## 2024-11-27 ENCOUNTER — OFFICE VISIT (OUTPATIENT)
Dept: DERMATOLOGY | Facility: IMAGING CENTER | Age: 19
End: 2024-11-27
Payer: COMMERCIAL

## 2024-11-27 ENCOUNTER — HOSPITAL ENCOUNTER (OUTPATIENT)
Dept: LAB | Facility: MEDICAL CENTER | Age: 19
End: 2024-11-27
Attending: NURSE PRACTITIONER
Payer: COMMERCIAL

## 2024-11-27 VITALS
WEIGHT: 192.8 LBS | TEMPERATURE: 97 F | RESPIRATION RATE: 16 BRPM | DIASTOLIC BLOOD PRESSURE: 72 MMHG | OXYGEN SATURATION: 98 % | HEART RATE: 76 BPM | SYSTOLIC BLOOD PRESSURE: 114 MMHG | HEIGHT: 61 IN | BODY MASS INDEX: 36.4 KG/M2

## 2024-11-27 DIAGNOSIS — Z28.21 IMMUNIZATION DECLINED: ICD-10-CM

## 2024-11-27 DIAGNOSIS — L73.2 SUPPURATIVE HIDRADENITIS: ICD-10-CM

## 2024-11-27 DIAGNOSIS — Z79.899 HIGH RISK MEDICATION USE: ICD-10-CM

## 2024-11-27 DIAGNOSIS — L73.2 HIDRADENITIS SUPPURATIVA: ICD-10-CM

## 2024-11-27 DIAGNOSIS — N91.1 SECONDARY AMENORRHEA: ICD-10-CM

## 2024-11-27 DIAGNOSIS — E28.2 PCOS (POLYCYSTIC OVARIAN SYNDROME): ICD-10-CM

## 2024-11-27 DIAGNOSIS — E78.5 DYSLIPIDEMIA: ICD-10-CM

## 2024-11-27 LAB
ALBUMIN SERPL BCP-MCNC: 4.4 G/DL (ref 3.2–4.9)
ALBUMIN/GLOB SERPL: 1.1 G/DL
ALP SERPL-CCNC: 106 U/L (ref 30–99)
ALT SERPL-CCNC: 18 U/L (ref 2–50)
ANION GAP SERPL CALC-SCNC: 11 MMOL/L (ref 7–16)
AST SERPL-CCNC: 20 U/L (ref 12–45)
BASOPHILS # BLD AUTO: 0.5 % (ref 0–1.8)
BASOPHILS # BLD: 0.04 K/UL (ref 0–0.12)
BILIRUB SERPL-MCNC: 0.2 MG/DL (ref 0.1–1.5)
BUN SERPL-MCNC: 15 MG/DL (ref 8–22)
CALCIUM ALBUM COR SERPL-MCNC: 9.5 MG/DL (ref 8.5–10.5)
CALCIUM SERPL-MCNC: 9.8 MG/DL (ref 8.5–10.5)
CHLORIDE SERPL-SCNC: 103 MMOL/L (ref 96–112)
CO2 SERPL-SCNC: 25 MMOL/L (ref 20–33)
CREAT SERPL-MCNC: 0.81 MG/DL (ref 0.5–1.4)
EOSINOPHIL # BLD AUTO: 0.1 K/UL (ref 0–0.51)
EOSINOPHIL NFR BLD: 1.2 % (ref 0–6.9)
ERYTHROCYTE [DISTWIDTH] IN BLOOD BY AUTOMATED COUNT: 41.1 FL (ref 35.9–50)
FASTING STATUS PATIENT QL REPORTED: NORMAL
GFR SERPLBLD CREATININE-BSD FMLA CKD-EPI: 106 ML/MIN/1.73 M 2
GLOBULIN SER CALC-MCNC: 4 G/DL (ref 1.9–3.5)
GLUCOSE SERPL-MCNC: 86 MG/DL (ref 65–99)
HAV IGM SERPL QL IA: NORMAL
HBV CORE IGM SER QL: NORMAL
HBV SURFACE AG SER QL: NORMAL
HCT VFR BLD AUTO: 45.5 % (ref 37–47)
HCV AB SER QL: NORMAL
HGB BLD-MCNC: 15 G/DL (ref 12–16)
HIV 1+2 AB+HIV1 P24 AG SERPL QL IA: NORMAL
IMM GRANULOCYTES # BLD AUTO: 0.01 K/UL (ref 0–0.11)
IMM GRANULOCYTES NFR BLD AUTO: 0.1 % (ref 0–0.9)
LYMPHOCYTES # BLD AUTO: 2.62 K/UL (ref 1–4.8)
LYMPHOCYTES NFR BLD: 32.5 % (ref 22–41)
MCH RBC QN AUTO: 29.8 PG (ref 27–33)
MCHC RBC AUTO-ENTMCNC: 33 G/DL (ref 32.2–35.5)
MCV RBC AUTO: 90.3 FL (ref 81.4–97.8)
MONOCYTES # BLD AUTO: 0.6 K/UL (ref 0–0.85)
MONOCYTES NFR BLD AUTO: 7.5 % (ref 0–13.4)
NEUTROPHILS # BLD AUTO: 4.68 K/UL (ref 1.82–7.42)
NEUTROPHILS NFR BLD: 58.2 % (ref 44–72)
NRBC # BLD AUTO: 0 K/UL
NRBC BLD-RTO: 0 /100 WBC (ref 0–0.2)
PLATELET # BLD AUTO: 320 K/UL (ref 164–446)
PMV BLD AUTO: 9.9 FL (ref 9–12.9)
POTASSIUM SERPL-SCNC: 4.1 MMOL/L (ref 3.6–5.5)
PROT SERPL-MCNC: 8.4 G/DL (ref 6–8.2)
RBC # BLD AUTO: 5.04 M/UL (ref 4.2–5.4)
SODIUM SERPL-SCNC: 139 MMOL/L (ref 135–145)
WBC # BLD AUTO: 8.1 K/UL (ref 4.8–10.8)

## 2024-11-27 PROCEDURE — 99213 OFFICE O/P EST LOW 20 MIN: CPT | Performed by: NURSE PRACTITIONER

## 2024-11-27 PROCEDURE — 99214 OFFICE O/P EST MOD 30 MIN: CPT | Performed by: STUDENT IN AN ORGANIZED HEALTH CARE EDUCATION/TRAINING PROGRAM

## 2024-11-27 PROCEDURE — 80074 ACUTE HEPATITIS PANEL: CPT

## 2024-11-27 PROCEDURE — 80053 COMPREHEN METABOLIC PANEL: CPT

## 2024-11-27 PROCEDURE — 87389 HIV-1 AG W/HIV-1&-2 AB AG IA: CPT

## 2024-11-27 PROCEDURE — 3074F SYST BP LT 130 MM HG: CPT | Performed by: STUDENT IN AN ORGANIZED HEALTH CARE EDUCATION/TRAINING PROGRAM

## 2024-11-27 PROCEDURE — 85025 COMPLETE CBC W/AUTO DIFF WBC: CPT

## 2024-11-27 PROCEDURE — 3078F DIAST BP <80 MM HG: CPT | Performed by: STUDENT IN AN ORGANIZED HEALTH CARE EDUCATION/TRAINING PROGRAM

## 2024-11-27 PROCEDURE — 86480 TB TEST CELL IMMUN MEASURE: CPT

## 2024-11-27 PROCEDURE — 36415 COLL VENOUS BLD VENIPUNCTURE: CPT

## 2024-11-27 RX ORDER — METFORMIN HYDROCHLORIDE 500 MG/1
500 TABLET, EXTENDED RELEASE ORAL DAILY
Qty: 30 TABLET | Refills: 1 | Status: SHIPPED | OUTPATIENT
Start: 2024-11-27

## 2024-11-27 ASSESSMENT — FIBROSIS 4 INDEX: FIB4 SCORE: 0.31

## 2024-11-27 NOTE — PROGRESS NOTES
Subjective:     CC:   Chief Complaint   Patient presents with    Lab Results     Ultrasound 10/01    Medication Problem     Metformin complications    derm recommenced cosentix or humra        HPI:     Verbal consent was acquired by the patient to use [employer] ambient listening note generation during this visit Yes      lupe Cordero, 19 y.o., female,  presents today to discuss:     History of Present Illness  The patient presents for evaluation of polycystic ovary syndrome (PCOS), accompanied by her mother.     Polycystic Ovary Syndrome (PCOS)  She underwent a pelvic ultrasound on 10/01/2024 and had hormone assays performed. Her last menstrual period occurred from 08/25/2024 to 09/02/2024, characterized by menorrhagia. She denies hirsutism on her face or legs.  - Onset: Last menstrual period from 08/25/2024 to 09/02/2024.  - Character: Menorrhagia.    Metformin Use and Side Effects  The patient has a history of metformin use for HS, which was discontinued due to gastrointestinal side effects, specifically nausea. She would like to trial metformin again, given its efficacy in managing her hidradenitis suppurativa (HS).     Elevated insulin levels were noted, leading to dietary modifications. She was prescribed oral contraceptive pills by her dermatologist, which failed to induce menstruation.     FAMILY HISTORY UPDATE  - Father and grandfather had throat and tongue cancer  - Father had cancerous lymph nodes in the back of his throat and neck removed  - Paternal aunt and grandmother had breast cancer  - Cousins, aunt, and sister have PCOS  - Father has MS  - Maternal grandmother has diabetes, high cholesterol, and high blood pressure       ROS:  See HPI    Medications, allergies, past medical history, family history, surgical history, and social history documented in chart and reviewed by me.       Objective:   Exam:  /72 (BP Location: Left arm, Patient Position: Sitting, BP Cuff Size: Large  "adult)   Pulse 76   Temp 36.1 °C (97 °F) (Temporal)   Resp 16   Ht 1.549 m (5' 1\")   Wt 87.5 kg (192 lb 12.8 oz)   LMP 08/25/2024 (Exact Date)   SpO2 98%   BMI 36.43 kg/m²      General: In no acute distress. Normal appearance.   Head:   Atraumatic, normocephalic.   Neck: Supple without lymphadenopathy.   Pulmonary: Normal effort, clear to auscultation bilaterally, no wheezes, rhonchi, or rales  Cardiovascular:   Regular rate and rhythm. No murmurs, rubs, or gallops.  Musculoskeletal:  Normal ROM. No edema.    Skin: No visible rashes or lesions.  Neurological: Alert and oriented to person, place, and time. Gait normal.   Psychiatric: Normal mood and affect. Calm and friendly behavior. Speech clear. Normal judgement and insight.         Assessment & Plan:       Assessment & Plan  1. PCOS (polycystic ovarian syndrome)  New diagnosis.  Her ultrasound results were normal, except for several small ovarian cysts. Based on the imaging and her symptoms, a diagnosis of PCOS was made. She had a menstrual cycle from August 25 to September 2, which was quite heavy.  She took metformin in the past for HS but discontinued due to nausea.  Patient would like to trial metformin again.  She was prescribed metformin extended release 500 mg, to be taken once daily with food, to help with weight management and PCOS symptoms. She was advised to maintain a healthy diet, engage in regular exercise, and aim for weight loss to prevent future complications. If she experiences any adverse effects from the metformin, she should inform the clinic.   - metFORMIN ER (GLUCOPHAGE XR) 500 MG TABLET SR 24 HR; Take 1 Tablet by mouth every day.  Dispense: 30 Tablet; Refill: 1    2. Secondary amenorrhea  Chronic, stable. Secondary to PCOS. Pt declined OCP at this time.     3. Suppurative hidradenitis  Chronic, uncontrolled. Managed by dermatology. She's waiting for insurance approval for Cosentyx.     4. Dyslipidemia  Chronic. Stable. Her blood " work from August 6, 2024, indicated elevated cholesterol levels, but not to the extent that would necessitate cholesterol medication. She was advised to maintain a healthy diet and engage in regular exercise to manage her cholesterol levels. Recommend yearly lipid panel checks.     5. Immunization declined  She was offered the influenza vaccine, but declined.    Follow-up  Return in 6 months or sooner if necessary.         Diagnosis and treatment plan explained to pt. Counseled pt on new medication(s) and potential side effects. Pt agreed with treatment plan and verbalized understanding.    Return in about 6 months (around 5/27/2025) for PCOS or sooner if needed.     Please note that this dictation was created using voice recognition software. I have made every reasonable attempt to correct obvious errors, but I expect that there are errors of grammar and possibly content that I did not discover before finalizing the note.    Karina Burton PA-C  [facility]

## 2024-11-27 NOTE — PROGRESS NOTES
DERMATOLOGY NOTE  NEW VISIT       Chief complaint: Follow-Up (HS)  Pt has been having minor flare ups  and some spots on neck. Pt's mother stated pt's PCP has had conversations of getting her on metformin, inquiring about biologic. Has been diagnosed with PCOS, PCP considering restarting metformin      Patient states she has HS was previously seen by old derm in [facility] wants to establish care.        History of skin cancer: No  History of precancers/actinic keratoses: No  History of biopsies:No  History of blistering/severe sunburns:No  Family history of skin cancer:Yes, Details: Father Melanoma   Family history of atypical moles:No      Allergies   Allergen Reactions    Metformin Nausea        MEDICATIONS:  Medications relevant to specialty reviewed.     REVIEW OF SYSTEMS:   Positive for skin (see HPI)  Negative for fevers and chills       EXAM:  There were no vitals taken for this visit.  Constitutional: Well-developed, well-nourished, and in no distress.     A focused skin exam was performed including the affected areas of the bilateral axilla. Notable findings on exam today listed below and/or in assessment/plan.     Few small erythematous papules to bilateral axilla, no evidence of double comedones, mild scarring noted     IMPRESSION / PLAN:    1. Hidradenitis suppurativa, favored Dx, likely Godoy stage I-II--not significantly improved with tx--doxy, topical clindamycin, Hibiclens body wash  Previously tx'd by derm in [facility], was on metformin and OCPs, stopped due to side effects  Pt understands course/nature of disease, as well as supportive measures--loose fitting clothing, weight loss, avoid smoking  Again advised to continue with Hibiclens, topical clindamycin  Will start PA for Cosentyx, labs as below    2. High risk medication use  - Quantiferon Gold TB (PPD); Future  - CBC WITH DIFFERENTIAL; Future  - HEPATITIS PANEL ACUTE(4 COMPONENTS); Future  - HIV ANTIBODIES; Future  - Comp Metabolic Panel;  Future        Discussed risks, benefits, alternative treatments as well as common side effects associated with prescribed treatment, Patient verbalized understanding and agrees with plan regarding he above        Please note that this dictation was created using voice recognition software. I have made every reasonable attempt to correct obvious errors, but I expect that there are errors of grammar and possibly content that I did not discover before finalizing the note.      Return to clinic in: Return for Pending PA for Cosentyx. and as needed for any new or changing skin lesions.

## 2024-11-27 NOTE — TELEPHONE ENCOUNTER
DOCUMENTATION OF PAR STATUS:    1. Name of Medication & Dose: Cosentyx Sensoready Pen 150MG/ML auto-injectors     2. Name of Prescription Coverage Company & phone #: [employer]     3. Date Prior Auth Submitted: 11/27/2024    4. What information was given to obtain insurance decision?  Patient information ins information     5. Prior Auth Status? Pending    6. Patient Notified: no

## 2024-11-29 LAB
GAMMA INTERFERON BACKGROUND BLD IA-ACNC: 0.04 IU/ML
M TB IFN-G BLD-IMP: NEGATIVE
M TB IFN-G CD4+ BCKGRND COR BLD-ACNC: 0.03 IU/ML
MITOGEN IGNF BCKGRD COR BLD-ACNC: >10 IU/ML
QFT TB2 - NIL TBQ2: 0.04 IU/ML

## 2024-12-16 DIAGNOSIS — L73.2 HIDRADENITIS SUPPURATIVA: ICD-10-CM

## 2024-12-20 ENCOUNTER — OFFICE VISIT (OUTPATIENT)
Dept: MEDICAL GROUP | Facility: IMAGING CENTER | Age: 19
End: 2024-12-20
Payer: COMMERCIAL

## 2024-12-20 VITALS
TEMPERATURE: 98.4 F | OXYGEN SATURATION: 96 % | WEIGHT: 189 LBS | BODY MASS INDEX: 35.68 KG/M2 | RESPIRATION RATE: 16 BRPM | HEIGHT: 61 IN | SYSTOLIC BLOOD PRESSURE: 112 MMHG | DIASTOLIC BLOOD PRESSURE: 76 MMHG | HEART RATE: 106 BPM

## 2024-12-20 DIAGNOSIS — E28.2 PCOS (POLYCYSTIC OVARIAN SYNDROME): ICD-10-CM

## 2024-12-20 DIAGNOSIS — R50.9 FEVER, UNSPECIFIED FEVER CAUSE: ICD-10-CM

## 2024-12-20 DIAGNOSIS — R00.0 TACHYCARDIA: ICD-10-CM

## 2024-12-20 PROCEDURE — 3074F SYST BP LT 130 MM HG: CPT | Performed by: STUDENT IN AN ORGANIZED HEALTH CARE EDUCATION/TRAINING PROGRAM

## 2024-12-20 PROCEDURE — 3078F DIAST BP <80 MM HG: CPT | Performed by: STUDENT IN AN ORGANIZED HEALTH CARE EDUCATION/TRAINING PROGRAM

## 2024-12-20 PROCEDURE — 99213 OFFICE O/P EST LOW 20 MIN: CPT | Performed by: STUDENT IN AN ORGANIZED HEALTH CARE EDUCATION/TRAINING PROGRAM

## 2024-12-20 RX ORDER — METFORMIN HYDROCHLORIDE 500 MG/1
500 TABLET, EXTENDED RELEASE ORAL DAILY
Qty: 90 TABLET | Refills: 3 | Status: SHIPPED | OUTPATIENT
Start: 2024-12-20

## 2024-12-20 ASSESSMENT — FIBROSIS 4 INDEX: FIB4 SCORE: .2946278254943948018

## 2024-12-20 NOTE — PROGRESS NOTES
Subjective:     CC:   Chief Complaint   Patient presents with    Bloody Stools     First occurrence was 12/4/2024 when starting metformin, second occurrence was 2 days later    Medication Refill     Metformin        HPI:     Verbal consent was acquired by the patient to use [employer] ambient listening note generation during this visit Yes      Alissa Hoskins Menglupe, 19 y.o., female,  presents today to discuss:     History of Present Illness  The patient presents for evaluation of hematochezia, upper respiratory infection symptoms, and medication management.    Bleeding  The patient reports two episodes of minor rectal bleeding, which she associates with the initiation of metformin therapy. The initial episode was mild, whereas the subsequent episode was more significant. Hematochezia was noted both in the stool and on toilet paper post-defecation. She denies any episodes of constipation during these occurrences. Additionally, she experienced a transient episode of diarrhea. Despite these symptoms, she continues metformin therapy without further bleeding episodes.    The patient is currently awaiting insurance approval for Cosentyx, which she anticipates will be beneficial for her suppurative hidradenitis. She has been informed that the insurance company will contact the physician's office for the necessary information but has not yet received confirmation regarding the mail order.    Recently, the patient experienced symptoms consistent with an upper respiratory infection, including fever and fatigue, which commenced yesterday. She has been managing these symptoms with acetaminophen and ibuprofen.    The patient requests a refill for metformin.           ROS:  See HPI    Medications, allergies, past medical history, family history, surgical history, and social history documented in chart and reviewed by me.       Objective:   Exam:  /76 (BP Location: Left arm, Patient Position: Sitting, BP Cuff  "Size: Adult)   Pulse (!) 106   Temp 36.9 °C (98.4 °F) (Temporal)   Resp 16   Ht 1.549 m (5' 1\")   Wt 85.7 kg (189 lb)   LMP 08/25/2024 (Exact Date)   SpO2 96%   BMI 35.71 kg/m²      General: In no acute distress. Normal appearance.   Head:   Atraumatic, normocephalic.   Neck: Supple without lymphadenopathy.   Pulmonary: Normal effort, clear to auscultation bilaterally, no wheezes, rhonchi, or rales  Cardiovascular:   Regular rate and rhythm. No murmurs, rubs, or gallops.  Musculoskeletal:  Normal ROM. No edema.    Skin: No visible rashes or lesions.  Neurological: Alert and oriented to person, place, and time. Gait normal.   Psychiatric: Normal mood and affect. Calm and friendly behavior. Speech clear. Normal judgement and insight.         Assessment & Plan:       Assessment & Plan  1. PCOS (polycystic ovarian syndrome)  Chronic, stable. She experienced some rectal bleeding after the initiation of metformin.  Bleeding has resolved.  Patient has been taking metformin every day without side effects.  Patient is advised to continue with metformin  mg daily. If additional bleeding occurs, she should stop taking metformin and notify me. Metformin refills provided today.     - metFORMIN ER (GLUCOPHAGE XR) 500 MG TABLET SR 24 HR; Take 1 Tablet by mouth every day.  Dispense: 90 Tablet; Refill: 3    2. Fever, unspecified fever cause  3. Tachycardia  Acute. Her temperature is within normal range, but her pulse is slightly elevated, potentially due to her current illness.  She is advised to take Tylenol or ibuprofen as needed for fever management. For cough relief, over-the-counter medications such as Robitussin, DayQuil, NyQuil, or Mucinex can be used as needed. She is also advised to seek immediate medical attention at the hospital if her condition worsens. It is recommended that she maintain adequate hydration and inform the office if her symptoms persist or worsen.        Pt agreed with treatment plan and " verbalized understanding.     Return in about 6 months (around 6/20/2025) for Asthma/PCOS or sooner if needed.     Please note that this dictation was created using voice recognition software. I have made every reasonable attempt to correct obvious errors, but I expect that there are errors of grammar and possibly content that I did not discover before finalizing the note.    Karina Burton PA-C  [facility]

## 2024-12-24 ENCOUNTER — TELEPHONE (OUTPATIENT)
Dept: DERMATOLOGY | Facility: IMAGING CENTER | Age: 19
End: 2024-12-24
Payer: COMMERCIAL

## 2024-12-24 NOTE — TELEPHONE ENCOUNTER
[facility] calld to clarify Rx for Cosentyx. Prescription loading dose was sent for Auto injector pen and Maintenance dose was written as pre-filled syringe. Maintenance dose was corrected to pen. They said they will call pt on Thursday 12/26/2024 to set up delivery.

## 2025-01-16 ENCOUNTER — APPOINTMENT (OUTPATIENT)
Dept: MEDICAL GROUP | Facility: IMAGING CENTER | Age: 20
End: 2025-01-16
Payer: COMMERCIAL

## 2025-08-18 DIAGNOSIS — L73.2 HIDRADENITIS SUPPURATIVA: ICD-10-CM

## 2025-08-18 RX ORDER — SECUKINUMAB 150 MG/ML
INJECTION SUBCUTANEOUS
Qty: 1 ML | Refills: 3 | Status: SHIPPED | OUTPATIENT
Start: 2025-08-18

## 2025-08-19 ENCOUNTER — OFFICE VISIT (OUTPATIENT)
Dept: MEDICAL GROUP | Facility: IMAGING CENTER | Age: 20
End: 2025-08-19
Payer: COMMERCIAL

## 2025-08-19 VITALS
WEIGHT: 187.8 LBS | TEMPERATURE: 98.8 F | BODY MASS INDEX: 35.45 KG/M2 | OXYGEN SATURATION: 99 % | HEART RATE: 72 BPM | SYSTOLIC BLOOD PRESSURE: 112 MMHG | HEIGHT: 61 IN | RESPIRATION RATE: 16 BRPM | DIASTOLIC BLOOD PRESSURE: 68 MMHG

## 2025-08-19 DIAGNOSIS — E28.2 PCOS (POLYCYSTIC OVARIAN SYNDROME): ICD-10-CM

## 2025-08-19 DIAGNOSIS — Z12.4 PAP SMEAR FOR CERVICAL CANCER SCREENING: ICD-10-CM

## 2025-08-19 DIAGNOSIS — E66.9 OBESITY (BMI 30-39.9): Primary | ICD-10-CM

## 2025-08-19 PROBLEM — N91.1 SECONDARY AMENORRHEA: Status: RESOLVED | Noted: 2024-05-10 | Resolved: 2025-08-19

## 2025-08-19 PROBLEM — E66.812 CLASS 2 OBESITY WITHOUT SERIOUS COMORBIDITY WITH BODY MASS INDEX (BMI) OF 37.0 TO 37.9 IN ADULT: Status: RESOLVED | Noted: 2024-05-10 | Resolved: 2025-08-19

## 2025-08-19 PROCEDURE — 3078F DIAST BP <80 MM HG: CPT | Performed by: STUDENT IN AN ORGANIZED HEALTH CARE EDUCATION/TRAINING PROGRAM

## 2025-08-19 PROCEDURE — 99214 OFFICE O/P EST MOD 30 MIN: CPT | Performed by: STUDENT IN AN ORGANIZED HEALTH CARE EDUCATION/TRAINING PROGRAM

## 2025-08-19 PROCEDURE — 3074F SYST BP LT 130 MM HG: CPT | Performed by: STUDENT IN AN ORGANIZED HEALTH CARE EDUCATION/TRAINING PROGRAM

## 2025-08-19 RX ORDER — FLUTICASONE PROPIONATE 50 MCG
2 SPRAY, SUSPENSION (ML) NASAL
COMMUNITY
Start: 2025-08-13 | End: 2026-08-08

## 2025-08-19 RX ORDER — METFORMIN HYDROCHLORIDE 500 MG/1
500 TABLET, EXTENDED RELEASE ORAL
COMMUNITY
End: 2025-08-19

## 2025-08-19 ASSESSMENT — LIFESTYLE VARIABLES
HOW OFTEN DO YOU HAVE A DRINK CONTAINING ALCOHOL: 2-4 TIMES A MONTH
AUDIT-C TOTAL SCORE: 3
SKIP TO QUESTIONS 9-10: 0
HOW MANY STANDARD DRINKS CONTAINING ALCOHOL DO YOU HAVE ON A TYPICAL DAY: 1 OR 2
HOW OFTEN DO YOU HAVE SIX OR MORE DRINKS ON ONE OCCASION: LESS THAN MONTHLY

## 2025-08-19 ASSESSMENT — FIBROSIS 4 INDEX: FIB4 SCORE: 0.31

## 2025-08-19 ASSESSMENT — PATIENT HEALTH QUESTIONNAIRE - PHQ9: CLINICAL INTERPRETATION OF PHQ2 SCORE: 0

## 2025-08-21 ENCOUNTER — TELEPHONE (OUTPATIENT)
Dept: MEDICAL GROUP | Facility: IMAGING CENTER | Age: 20
End: 2025-08-21
Payer: COMMERCIAL

## 2025-08-26 DIAGNOSIS — L30.9 DERMATITIS: Primary | ICD-10-CM

## 2025-08-26 RX ORDER — TACROLIMUS 1 MG/G
OINTMENT TOPICAL
Qty: 30 G | Refills: 1 | Status: SHIPPED | OUTPATIENT
Start: 2025-08-26

## 2025-08-28 ENCOUNTER — TELEPHONE (OUTPATIENT)
Dept: MEDICAL GROUP | Facility: IMAGING CENTER | Age: 20
End: 2025-08-28
Payer: COMMERCIAL

## 2025-08-28 DIAGNOSIS — E66.9 OBESITY (BMI 30-39.9): Primary | ICD-10-CM

## 2025-08-28 RX ORDER — METFORMIN HYDROCHLORIDE 500 MG/1
1000 TABLET, EXTENDED RELEASE ORAL DAILY
Qty: 180 TABLET | Refills: 1 | Status: SHIPPED | OUTPATIENT
Start: 2025-08-28

## 2025-09-16 ENCOUNTER — APPOINTMENT (OUTPATIENT)
Dept: MEDICAL GROUP | Facility: IMAGING CENTER | Age: 20
End: 2025-09-16
Payer: COMMERCIAL

## 2025-10-15 ENCOUNTER — APPOINTMENT (OUTPATIENT)
Dept: MEDICAL GROUP | Facility: IMAGING CENTER | Age: 20
End: 2025-10-15
Payer: COMMERCIAL